# Patient Record
Sex: MALE | Race: WHITE | NOT HISPANIC OR LATINO | Employment: OTHER | URBAN - METROPOLITAN AREA
[De-identification: names, ages, dates, MRNs, and addresses within clinical notes are randomized per-mention and may not be internally consistent; named-entity substitution may affect disease eponyms.]

---

## 2023-06-06 ENCOUNTER — TELEPHONE (OUTPATIENT)
Dept: WOUND CARE | Facility: CLINIC | Age: 83
End: 2023-06-06
Payer: COMMERCIAL

## 2023-06-06 NOTE — PATIENT INSTRUCTIONS
Outpatient Ostomy Clinic Telephone Note     Patient met with Dr. Ortiz in clinic and spouse reports concerns about peristomal skin breakdown. Appointment scheduled for assessment 6/8/23 at 11:30am. Patient bring one full set of supplies to the appointment and  check in at Registration in [facility] and wait for WOC to come out and get them. Ok for patient to bring additional support person for teaching needs.     Ayo Harris RN CWOCN  -Securely message with [facility] (more info) - can reach individually by name or search 'WOC Nurse' (Barby) to reach all current WOCs on duty.  WOC Office Phone: [phone]

## 2023-06-08 ENCOUNTER — HOSPITAL ENCOUNTER (OUTPATIENT)
Dept: WOUND CARE | Facility: CLINIC | Age: 83
Discharge: HOME | End: 2023-06-08
Attending: COLON & RECTAL SURGERY | Admitting: COLON & RECTAL SURGERY
Payer: COMMERCIAL

## 2023-06-08 PROCEDURE — G0463 HOSPITAL OUTPT CLINIC VISIT: HCPCS

## 2023-06-08 NOTE — DISCHARGE INSTRUCTIONS
OUTPATIENT OSTOMY INSTRUCTIONS                                                                        Type of Stoma: Colostomy         Supplier: [employer] [phone]      Equipment:    Product # Brand Description   Pouch 11830 Robbie 1 pc CTF convex   Ring 36572 Coloplast Brava   Powder Entrust stoma powder     Liquid Skin Barrier 7807 3M Cavilon No Sting         Miconazole 2% Antifungal Powder                       Procedure:  Close the bottom of the pouch.  If needed cut the opening of your pouch to the correct size (follow pattern or 1/8 inch larger than stoma) and then remove backings from adhesive surfaces.  Remove the soiled pouch and discard.  Wash skin with water and dry.    Then:  Lightly dust reddened skin with miconazole 2% antifungal powder. Wipe away excess.  Dab with liquid skin barrier (skin prep) to secure in place and allow to dry for 30 seconds. For 2 weeks  Then: Apply Ring/Paste: Around edge of pouch opening.               Then: Apply pouching system to stoma site and hold in place for 2-5 minutes.     ______________________________________________________________________________    Pouch Change: Three times weekly       WOC Nurse Specialist: Paris LOYA           Questions: Barby [phone] ()      Follow-up Appointment: If rash does not improve in 2 weeks . Please call Barby [phone] () to request an appointment.

## 2023-06-08 NOTE — PROGRESS NOTES
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 Ortiz     Purpose of this visit: Peristomal Complications    Pertinent Information: Patient recently had colonoscopy with Dr. Ortiz who suggested he see an ostomy nurse. Patient reports that he sweats under his ostomy. Patient also has a fungal rash noted within his abdominal creases. Patient uses a belt, brava strips, and tape to secure his pouch as well.    Present for Teaching Session: Patient and Spouse   Present: KATHLEEN      Current Equipment:    Product # Brand Description   Pouch 44185 [place] 1 pc soft convex, CTF   Ring/Paste 52473 Coloplast Brava Ring     Powder   Entrust Stoma powder                       Pouch Change Frequency: Every 3-4 days  Provider of Care: Emptying: Self Pouch Change: Self    ASSESSMENT  Last Photo: 6/8/23    Stoma Size: Oval 1inch x 1 1/2 inches  Protrusion: Flush  Stoma Appearance: Granulomas and Moist  Mucocutaneous Juncture: Intact   Peristomal Skin: Rash approximately 7cm circumferentially from stoma and extends into pannus crease. Where patient is taping down but larger than the isabela flange. Is under the isabela flange and tape, under brava strips, and under additional tape that patient adds for securement.     Abdominal Assessment: Hernia (location: peristomal)    TREATMENT  Applied: miconazole 2% antifungal powder to peristomal skin and secured in place with Cavilon No Sting barrier film    INTERVENTIONS  Refitting done and Educated on peristomal skin treatment- patient was cutting his pouch to 1 inch, made opening more oval to fit stoma and discussed this with patient and wife.     INSTRUCTIONS GIVEN  WOC Role and Anatomy    PLAN  New Pouching Procedure: See Outpatient Ostomy Instructions. OK for patient to continue with same pouching supplies at  this time, but cut pouch to fit oval stoma. Patient sent with cut template from today. Patient and wife plan to obtain miconazole 2% OTC or through Dr. Ortiz. Instructed patient to try to avoid the brava strips and additional tape at this time to attempt to allow air to the area as the rash heals.     Paris Hollingsworth CWN   Dept. Vocera- Contact [facility] Nurse (Barby) via  Vocera   Dept. Office Number: [phone]

## 2023-06-16 ENCOUNTER — TELEPHONE (OUTPATIENT)
Dept: WOUND CARE | Facility: CLINIC | Age: 83
End: 2023-06-16
Payer: COMMERCIAL

## 2023-06-16 NOTE — TELEPHONE ENCOUNTER
Outpatient ostomy Telephone Note    Patient's wife and daughter called regarding scheduling a follow up outpatient appointment. Patient was seen 6/8 in our clinic. Called to schedule a follow up, reached voicemail. Informed patient and wife that we would have an availability on Tuesday June 20th, but that we are out of the office over the weekend.    Paris CHRISTOPHERLEVI   Dept. Vocera- Contact [facility] Nurse (Barby) via  Vocera   Dept. Office Number: [phone]

## 2023-06-29 ENCOUNTER — HOSPITAL ENCOUNTER (OUTPATIENT)
Dept: WOUND CARE | Facility: CLINIC | Age: 83
Discharge: HOME | End: 2023-06-29
Admitting: COLON & RECTAL SURGERY
Payer: COMMERCIAL

## 2023-06-29 PROCEDURE — G0463 HOSPITAL OUTPT CLINIC VISIT: HCPCS

## 2023-06-29 NOTE — CONSULTS
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 Ortiz     Purpose of this visit: Peristomal Complications    Pertinent Information: Patient recently had colonoscopy with Dr. Ortiz who suggested he see an ostomy nurse. Patient reports that he sweats under his ostomy. He has significant amount of gas output from drinking soda and typically pouch is falling off by day 2.  Patient typically uses 1pc convex isabela pouch with a belt, brava strips, and tape to secure his pouch as well. Skin is raw and weepy throughout, severely itching and has combination of bacterial/fungal rash that inhibits the barrier from adhereing appropriately. Pt also has small peristomal wound at 8 O'clock with granulomas at edges and peristomal hernia.     Present for Teaching Session: Patient, Spouse and daughter present for visit and very concerned that this is not normal. Patient seems to think he has been doing this for many years and assumes this is normal.   Present: NA      Current Equipment:    Product # Brand Description   Pouch 32615 [place] 1 pc soft convex, CTF   Ring/Paste 94711 Coloplast Brava Ring     Powder   antifungal powder                       Pouch Change Frequency: Every 1-2 days  Provider of Care: Emptying: Self Pouch Change: Self    ASSESSMENT  Last Photo: 6/29/23    Skin after being cleansed and scrubbed with vashe. Fungal rash extends across panus and down into the groin    Stoma Size: Oval 1inch x 1 1/2 inches  Protrusion: Flush  Stoma Appearance: Granulomas and Moist  Mucocutaneous Juncture: Intact   Peristomal Skin: Rash approximately 7cm circumferentially from stoma and extends into pannus crease. Where patient is taping down but larger than the isabela flange. Is under the isabela flange and tape, under brava strips, and under  additional tape that patient adds for securement.     Abdominal Assessment: Hernia (location: peristomal)    TREATMENT  Cleansed for 10 minutes with vashe  Applied: miconazole 2% antifungal powder to peristomal skin and secured in place with Cavilon No Sting barrier film    INTERVENTIONS  Refitting done, Educated on peristomal skin treatment, Educated on pouch change procedure and Physcian called: requested oral Diflucan, Betamethasone 1% Lotion and, Cipro 3% drops to treat peristomal skin- patient was cutting his pouch to 1 inch, made opening more oval to fit stoma and discussed this with patient and wife.     INSTRUCTIONS GIVEN  skin care treatment, reviewed diet and gas control    PLAN  New Pouching Procedure: See Outpatient Ostomy Instructions. Discontinue use of tape and Brava strips and decrease carbonation intake.  OK for patient to continue with same pouching supplies at this time, but cut pouch to fit oval stoma. Patient sent with cut template from today. Patient and wife  Will  prescriptions ordered by Dr. Ortiz from [facility] pharmacy in [place] and start using every other day for pouch changes then return to clinic for assessment in 1 week.  heals.     OUTPATIENT OSTOMY INSTRUCTIONS                                                                        Type of Stoma: Colostomy         Supplier: [employer] [phone]      Equipment: Continue 1pc [place] Convex lock and roll        Procedure:  Close the bottom of the pouch.  If needed cut the opening of your pouch to the correct size (follow pattern or 1/8 inch larger than stoma) and then remove backings from adhesive surfaces.  Remove the soiled pouch and discard.     Wash skin with Vashe (blue bottle wound cleanser) for 5-10mins and dry thoroughly.  Then: Thin layer of Betamethasone lotion with Cipro Drops mixed into the betamethasone to irritated area surrounding stoma and allow to completely dry.   Then: Apply a thin dusted layer of antifungal  powder at lower abdomen crease. Brush away the excess powder and blot with Cavilon No sting Skin Prep (blot anywhere the powder touches)  Then: Apply Ring: Around edge of pouch opening.               Then: Apply pouching system to stoma site and hold in place for 2-5 minutes.     ______________________________________________________________________________    Pouch Change: Three times weekly       WOC Nurse Specialist: Ayo Harris RN CWOCN     Questions: [place] [phone] ()      Follow-up Appointment: Ayo Harris Please call Barby [phone] () to request an appointment.      Ayo Harris RN CWOCN  -Securely message with [employer] (more info) - can reach individually by name or search 'WOC Nurse' (Barby) to reach all current WOCs on duty.  WOC Office Phone: [phone]

## 2023-07-03 ENCOUNTER — TELEPHONE (OUTPATIENT)
Dept: WOUND CARE | Facility: CLINIC | Age: 83
End: 2023-07-03
Payer: COMMERCIAL

## 2023-07-03 NOTE — TELEPHONE ENCOUNTER
"[facility] Outpatient Ostomy Clinic    Received call with voicemail from patient daughter 1:35p 7/3 stating bag not sticking with current directions.     Called back spoke to daughter via phone, provided suggestions of continuing the vashe and nystatin, applying the vashe and nystatin to the abd fold, changing the pouch every other day. Confirmed yes ok to blow dry on cool setting. Confirmed will see patient Friday for next appointment.     Miranda LOYA   1st: [employer], call \"Miranda Pham\" or call Group \"[facility] Nurse\"   (2nd option: leave a voicemail at Dept. Office Number: [phone]. Messages checked occasionally M-F)    "

## 2023-07-05 ENCOUNTER — TELEPHONE (OUTPATIENT)
Dept: WOUND CARE | Facility: CLINIC | Age: 83
End: 2023-07-05
Payer: COMMERCIAL

## 2023-07-05 NOTE — TELEPHONE ENCOUNTER
"[facility] Outpatient Ostomy Clinic      Received call from patient daughter inquiring about scheduled appointment for Friday 7/7 if there is an earlier appointment available. Spoke to patient daughter via phone and stated current appointment is still scheduled for Friday 10am and will call back 7/6 if an earlier appointment is available.       Miranda LOYA   1st: [employer], call \"Miranda Pham\" or call Group \"[facility] Nurse\"   (2nd option: leave a voicemail at Dept. Office Number: [phone]. Messages checked occasionally M-F)    "

## 2023-07-07 ENCOUNTER — HOSPITAL ENCOUNTER (OUTPATIENT)
Dept: WOUND CARE | Facility: CLINIC | Age: 83
Discharge: HOME | End: 2023-07-07
Admitting: COLON & RECTAL SURGERY
Payer: COMMERCIAL

## 2023-07-07 PROCEDURE — 17250 CHEM CAUT OF GRANLTJ TISSUE: CPT

## 2023-07-07 PROCEDURE — G0463 HOSPITAL OUTPT CLINIC VISIT: HCPCS

## 2023-07-07 NOTE — PROGRESS NOTES
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 Ortiz     Purpose of this visit: Peristomal Complications    Pertinent Information: Patient returns to clinic for follow up on peristomal skin. They obtained the prescriptions, however they have only had a couple treatments due being unable to get the pouch to stick. Spouse describes it like vaseline which makes me think they obtained the ointment.They didn't bring the prescriptions to clinic today so they will call me to let me know if they truly obtained the lotion and the drops or if prescription was written for ointment. Peristomal skin showing improvement, but still has ongoing care needs.     Present for Teaching Session: Patient, Spouse and daughter present for visit and very concerned that this is not normal. Patient seems to think he has been doing this for many years and assumes this is normal.   Present: NA      Current Equipment: Robbie 1pc CTF convex pouch with Adapt barrier ring.     Pouch Change Frequency: Every 1-2 days  Provider of Care: Emptying: Self Pouch Change: Self    ASSESSMENT  Last Photo:7/7/2023    Skin after being cleansed and scrubbed with vashe. Fungal rash extends across panus and down into the groin but showing improvement     6/29/23        Stoma Size: Oval 1inch x 1 1/2 inches  Protrusion: Flush  Stoma Appearance: Granulomas and Moist  Mucocutaneous Juncture: Intact   Peristomal Skin: Rash approximately 7cm circumferentially from stoma and extends into pannus crease. Diffuse erythema that is lightening and peristomal skin breakdown and denudement improving. Pt has Granulomas from 6-10 treated with silver nitrate.     Abdominal Assessment: Hernia (location: peristomal)    TREATMENT  Cleansed for 10 minutes with vashe  Applied: miconazole 2% antifungal powder to  peristomal skin and secured in place with Cavilon No Sting barrier film    INTERVENTIONS  Refitting done, Educated on peristomal skin treatment, Educated on pouch change procedure and educated on continuation of vashe, betamethasone lotion, cipro drops. Silver nitrate treatment to granulomas from 6-10 O'clock educated on skin discoloration and can take tylenol for discomfort, pt tolerated well    INSTRUCTIONS GIVEN  skin care treatment, reviewed diet and gas control again. Educated on ordering supplies to prevent running out.     PLAN  Continue same Pouching System. Patient sent with cut template from today. Patient and wife        OUTPATIENT OSTOMY INSTRUCTIONS                                                                        Type of Stoma: Colostomy         Supplier: [employer] [phone]      Equipment:    Product # Brand Description   Pouch 55431 [place] 1 pc CTF convex   Barrier ring 8805 Robbie Adapt   Powder 7906 Robbie Adapt   Liquid Skin Barrier 3344 3M Cavilon No Sting                Procedure:  Close the bottom of the pouch.  If needed cut the opening of your pouch to the correct size (follow pattern or 1/8 inch larger than stoma) and then remove backings from adhesive surfaces.  Remove the soiled pouch and discard.     Wash skin with Vashe (blue bottle wound cleanser) for 5-10mins and dry thoroughly.  Then: Thin layer of Betamethasone lotion with Cipro Drops mixed into the betamethasone to irritated area surrounding stoma and allow to completely dry.   Then: Apply a thin dusted layer of antifungal powder at lower abdomen crease. Brush away the excess powder and blot with Cavilon No sting Skin Prep (blot anywhere the powder touches)  Then: Apply Ring: Around edge of pouch opening.               Then: Apply pouching system to stoma site and hold in place for 2-5 minutes.       Ok to purchase Interdry for belt line. This is a silver wicking cloth that helps wick moisture.    ______________________________________________________________________________    Pouch Change: Three times weekly       WOC Nurse Specialist: Ayo CHRISTOPHEROCN     Questions: Barby [phone] ()      Follow-up Appointment: 2 weeks 7/20/23 9:00aam        CARIDAD LalaN  -Securely message with [employer] (more info) - can reach individually by name or search 'WOC Nurse' (Barby) to reach all current WOCs on duty.  WOC Office Phone: [phone]    .

## 2023-07-21 ENCOUNTER — HOSPITAL ENCOUNTER (OUTPATIENT)
Dept: WOUND CARE | Facility: CLINIC | Age: 83
Discharge: HOME | End: 2023-07-21
Attending: INTERNAL MEDICINE | Admitting: INTERNAL MEDICINE
Payer: COMMERCIAL

## 2023-07-21 PROCEDURE — G0463 HOSPITAL OUTPT CLINIC VISIT: HCPCS

## 2023-07-21 NOTE — PROGRESS NOTES
"  OUTPATIENT OSTOMY ASSESSMENT    INTAKE  Type of Stoma: Permanent Colostomy    Diagnosis Pertinent to Stoma: Cancer - Colon or Rectum   Type of Surgery: APR   Surgery Date: 30+years  Following Colorectal: Dr. Ortiz     Purpose of this visit: Peristomal Complications    Pertinent Information: Patient returns to clinic for follow up on peristomal skin. Peristomal skin continues to show improvement.     Present for Teaching Session: Patient, Spouse and daughter    Present: KATHLEEN      Current Equipment: [place] 1pc CTF convex pouch with Adapt barrier ring and ostomy belt     Pouch Change Frequency: Every 2 days  Provider of Care: Emptying: Self Pouch Change: Spouse/dtr assist    ASSESSMENT  Last Photo:7/7/2023 photo didn't save 7/21    Skin after being cleansed and scrubbed with vashe. Fungal rash extends across panus and down into the groin but showing improvement     6/29/23        Stoma Size: Oval 1inch x 1 1/2 inches  Protrusion: Flush  Stoma Appearance: Granulomas and Moist  Mucocutaneous Juncture: Intact   Peristomal Skin:  Diffuse erythema that is circumfrential and extends approx 8cm from stoma. Erythema is lightening over the past month but still persists and peristomal skin breakdown with no further denudement. Pt has large granulomas at 7 O'clock treated with silver nitrate. Pt continues with diffuse rash to Lower pannus crease and groin    Abdominal Assessment: Hernia (location: peristomal)    TREATMENT  Applied: miconazole 2% antifungal powder to peristomal skin and secured in place with Cavilon No Sting barrier film, Silver nitrate to granuloms-pt tolerated very well    INTERVENTIONS  Refitting done, Educated on peristomal skin treatment, Educated on pouch change procedure and educated on continuation of vashe, betamethasone lotion, cipro drops. Silver nitrate treatment to granulomas at 7\"  O'clock, educated on skin discoloration and can take tylenol for discomfort, pt tolerated well.    Call " Placed to [facility] to request additional dosing of Fluconazole due to persistent fungal infection (no call back by end of day, may take until early next week, call placed to pt/spouse) not healing with antifungal powder. Educated pt that he must completely dry his body after showers and not get dressed until skin is completely clean    INSTRUCTIONS GIVEN  Skin care and ways to treat and prevent fungal rash.      PLAN  Continue same Pouching System. Daughter and spouse concerned about skin breakdown returning. Patient will follow up in 1 month to assess      OUTPATIENT OSTOMY INSTRUCTIONS                                                                        Type of Stoma: Colostomy         Supplier: [employer] [phone]      Equipment:    Product # Brand Description   Pouch [address]1755 [place] 1 pc CTF convex   Barrier ring 8805 [place] Adapt   Powder 7906 [place] Adapt   Liquid Skin Barrier 3344 3M Cavilon No Sting                Procedure:  Close the bottom of the pouch.  If needed cut the opening of your pouch to the correct size (follow pattern or 1/8 inch larger than stoma) and then remove backings from adhesive surfaces.  Remove the soiled pouch and discard.     Wash skin with Vashe (blue bottle wound cleanser) for 5-10mins and dry thoroughly.  Then: Apply powder to any red, irritated or open areas. Rub the powder in and brush off excess then blot with Cavilon No Sting Skin prep (blot anywhere the powder touches)..   Then: Apply Ring: Around edge of pouch opening.               Then: Apply pouching system to stoma site and hold in place for 2-5 minutes.  Dust Antifungal powder to lower abdomen crease if rashy.       Ok to purchase Interdry for belt line. This is a silver wicking cloth that helps wick moisture.   ______________________________________________________________________________    Pouch Change: Three times weekly       WOC Nurse Specialist: Ayo Harris RN CWOCN     Questions:  Barby [phone] ()      Follow-up Appointment: 8/22/23 at 10:00, patient may call and cancel appointment if things are going well        Ayo Harris RN CWOCN  -Securely message with [employer] (more info) - can reach individually by name or search 'WOC Nurse' (Barby) to reach all current WOCs on duty.  WOC Office Phone: [phone]    .

## 2023-08-11 ENCOUNTER — HOSPITAL ENCOUNTER (OUTPATIENT)
Dept: WOUND CARE | Facility: CLINIC | Age: 83
Discharge: HOME | End: 2023-08-11
Admitting: COLON & RECTAL SURGERY
Payer: COMMERCIAL

## 2023-08-11 PROCEDURE — G0463 HOSPITAL OUTPT CLINIC VISIT: HCPCS

## 2023-08-11 NOTE — PROGRESS NOTES
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 Ortiz     Purpose of this visit: Peristomal Complications    Pertinent Information: Patient returns to clinic for follow up on peristomal skin with new wound at 9 O'clock. Peristomal skin continues to show improvement, however he has a persistent rash to groin and lower pannus.    Present for Teaching Session: Patient and Spouse   Present: KATHLEEN      Current Equipment: Robbie 1pc CTF convex pouch with Adapt barrier ring and ostomy belt     Pouch Change Frequency: Every 2-3 days  Provider of Care: Emptying: Self Pouch Change: Spouse/dtr assist    ASSESSMENT  Last Photo: 8/11/23 7/7/2023       Skin after being cleansed and scrubbed with vashe. Fungal rash extends across panus and down into the groin but showing improvement     6/29/23        Stoma Size: Approx Oval 1inch x 1 1/2 inches  Protrusion: Flush  Stoma Appearance: Large granuloma at 7 O'clock   Mucocutaneous Juncture: Intact   Peristomal Skin:  Diffuse chronic erythema extends approx 8cm from stoma that is slowly resolving with time and good skin care. Pt has large granuloma at 7 O'clock treated with silver nitrate. New wound with 100% granulation 1.5 x 1.1 x 0.1 at 9 O'clock approximately 1cm from stoma and lines up with belt tab and belt.  Pt continues with diffuse rash to Lower pannus crease and groin that has not shown improvement with washing with vashe, oral Fluconazole and topical antifungal.     Abdominal Assessment: Hernia (location: peristomal)    TREATMENT  Applied No Sting Skin barrier, 1 stick of Silver nitrate to granuloma-pt tolerated very well    INTERVENTIONS  Educated on peristomal skin treatment and Educated on pouch change procedure-Treating wound with small piece of hydrofera blue ring    Silver nitrate to  granuloma    Educated pt that he must completely dry his body after showers and not get dressed until skin is completely clean and dry. He reports he has been focusing on this. Current ostomy belt feels like nylon material so will try [place] adapt to see if that works better. He reports he is using clean dry belts.     Educated to stop oral fluconazole and topical miconazole. Will trial topical antibacterial to see if this impacts. Encouraged patient/spouse to set up appointment with Dermatology if antibacterial doesn't seem to impact.    INSTRUCTIONS GIVEN  Skin care as stated above, follow up appointment scheduled    PLAN  Continue same Pouching System, hydroferablue to wound and Robbie ostomy belt with small piece of gauze to decrease pressure from belt tab.      OUTPATIENT OSTOMY INSTRUCTIONS                                                                        Type of Stoma: Colostomy         Supplier: [employer] [phone]      Equipment:    Product # Brand Description   Pouch 36711 Robbie 1 pc CTF convex- Cut pouch just inside 40mm line or follow template   Barrier ring 8805 Robbie Adapt   Powder 7906 Robbie Adapt   Liquid Skin Barrier 3344 3M Cavilon No Sting   Ostomy belt 7299 Robbie Adapt ostomy belt                Procedure:  Close the bottom of the pouch.  If needed cut the opening of your pouch to the correct size (follow pattern or 1/8 inch larger than stoma) and then remove backings from adhesive surfaces.  Remove the soiled pouch and discard.     Wash skin with Vashe (blue bottle wound cleanser) for 5-10mins and dry thoroughly.  Then: Cut a small piece of the hydrofera blue ring and apply over wound outside the stoma at 9 O'Clock ([place] Side [place]).   Then: Apply Ring: Around stoma.               Then: Apply pouching system to stoma site and hold in place for 2-5 minutes.  Trial OTC antibiotic ointment to lower groin/abdomen and allow to fully dry.      Ok to purchase [employer] for  belt line. This is a silver wicking cloth that helps wick moisture.   ______________________________________________________________________________    Pouch Change: Three times weekly       WOC Nurse Specialist: Ayo Harris RN CWOCN     Questions: [place] [phone] ()      Follow-up Appointment: 8/22/23 at 9:00, patient may call and cancel appointment if things are going well        Ayo Harris RN CWOCN  -Securely message with [employer] (more info) - can reach individually by name or search 'WOC Nurse' (Barby) to reach all current WOCs on duty.  WOC Office Phone: [phone]    .

## 2023-08-22 ENCOUNTER — HOSPITAL ENCOUNTER (OUTPATIENT)
Dept: WOUND CARE | Facility: CLINIC | Age: 83
Discharge: HOME | End: 2023-08-22
Admitting: COLON & RECTAL SURGERY
Payer: COMMERCIAL

## 2023-08-22 PROCEDURE — G0463 HOSPITAL OUTPT CLINIC VISIT: HCPCS

## 2023-08-22 NOTE — PROGRESS NOTES
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 Ortiz     Purpose of this visit: Peristomal Complications    Pertinent Information: Patient returns to clinic for follow up on peristomal skin. Wound has been healing, however patient is having increasing erythema and weeping skin. Suspect patient could be having reaction to the tape adhesive now that we are done treating peristomal skin with topicals and erythema coming back. They used antibiotic ointment to pannus and rash almost completely resolved. Today patient has pouch taped down again despite education on no tape on skin and pouch must be changed. Patient does have strong odor of urine today and is scratching intensely at his abdomen. Discussed doing a trial of Coloplast pouching systems.    Present for Teaching Session: Patient, Spouse, and daughter   Present: NA      Current Equipment: Robbie 1pc CTF convex pouch with Adapt barrier ring and ostomy belt using hydrofera blue for small wound at 9 O'clock.    Pouch Change Frequency: Every 2-3 days  Provider of Care: Emptying: Self Pouch Change: Spouse/dtr assist    ASSESSMENT  Photo 8/22 not saved    Last Photo: 8/11/23 7/7/2023       Skin after being cleansed and scrubbed with vashe. Fungal rash extends across panus and down into the groin but showing improvement     6/29/23        Stoma Size: Approx Oval 1inch x 1 1/2 inches  Protrusion: Flush  Stoma Appearance: Large granuloma at 7 O'clock   Mucocutaneous Juncture: Intact   Peristomal Skin:  Diffuse chronic erythema extends approx 8cm from stoma that was resolving and now is returning with denuded tissue and scattered satellite lesions. Pt has large granuloma at 7 O'clock treated with silver nitrate. New wound with new epithelial tissue  Pt continues with diffuse rash to Lower  pannus crease and groin that has not shown improvement with washing with vashe, oral Fluconazole and topical antifungal.     Abdominal Assessment: Hernia (location: peristomal)    TREATMENT  Applied No Sting Skin barrier and Applied: antifungal powder , 1 stick of Silver nitrate to granuloma-pt tolerated very well    INTERVENTIONS  Educated on peristomal skin treatment and Educated on pouch change procedure    Silver nitrate to granuloma      INSTRUCTIONS GIVEN  Skin care as stated above, follow up appointment scheduled    PLAN  Will trial switching to Coloplast light pouching system with belt to see if tapeless helps his skin. Pt may benefit deeper convexity with belt.      OUTPATIENT OSTOMY INSTRUCTIONS                                                                        Type of Stoma: Colostomy         Supplier: [email or web address] [phone]      Equipment:    Product # Brand Description   Pouch 88034 Coloplast 1 pc CTF light convex   Barrier ring 8805 Robbie Adapt   Powder 7906 [place] Adapt   Liquid Skin Barrier 3344 3M Cavilon No Sting   Skin Barrier [place] 307649 Brava Coloplast Adhesive spray   Ostomy belt 3924 Brava Coloplast ostomy belt                Procedure:  Close the bottom of the pouch.  If needed cut the opening of your pouch to the correct size (follow pattern or 1/8 inch larger than stoma) and then remove backings from adhesive surfaces.  Remove the soiled pouch and discard.     Wash skin with Vashe (blue bottle wound cleanser) for 5mins and dry thoroughly.  Then: Dust all tissue around stoma with antifungal powder (Miconazole) brush off excess. Then apply a small amount of stoma powder directly around the stoma to any open areas and brush off excess. Spray entire area with the Skin barrier spray and allow to dry for 30 seconds.   Then: Apply Ring: Around stoma or onto the cut edge of the barrier.              Then: Apply pouching system to stoma site and hold in place for 2-5 minutes.  Then:  Apply the ostomy Belt.      ______________________________________________________________________________    Pouch Change: Three times weekly    Purchase: Miconazole Powder-Antifungal powder       WOC Nurse Specialist: Ayo Harris RN CWOCN     Questions: [place] [phone] ()      Follow-up Appointment: 8/31/23 at 9:00, patient may call and cancel appointment if things are going well  Ayo Harris RN CWOCN  -Securely message with [employer] (more info) - can reach individually by name or search 'WOC Nurse' (Barby) to reach all current WOCs on duty.  WOC Office Phone: [phone]    .

## 2023-09-06 ENCOUNTER — TELEPHONE (OUTPATIENT)
Dept: WOUND CARE | Facility: CLINIC | Age: 83
End: 2023-09-06
Payer: COMMERCIAL

## 2023-09-06 NOTE — TELEPHONE ENCOUNTER
Outpatient Ostomy Clinic Telephone Note     Spouse called to request earlier appointment, appointment currently scheduled for 9/14. WOC returned call and left VM. WOC has appointment availability Friday 9/8 or next week 9/12. Will wait to hear back from spouse to get appointment scheduled.    Aoy Harris RN CWOCN  -Securely message with [email or web address] (more info) - can reach individually by name or search 'WOC Nurse' (Barby) to reach all current WOCs on duty.  WOC Office Phone: [phone]

## 2023-09-08 ENCOUNTER — HOSPITAL ENCOUNTER (OUTPATIENT)
Dept: WOUND CARE | Facility: CLINIC | Age: 83
Discharge: HOME | End: 2023-09-08
Admitting: COLON & RECTAL SURGERY
Payer: COMMERCIAL

## 2023-09-08 DIAGNOSIS — Z43.3 COLOSTOMY CARE (H): Primary | ICD-10-CM

## 2023-09-08 PROCEDURE — G0463 HOSPITAL OUTPT CLINIC VISIT: HCPCS

## 2023-09-08 NOTE — PROGRESS NOTES
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 Ortiz     Purpose of this visit: Peristomal Complications    Pertinent Information: Patient returns to clinic after receiving samples for coloplast he ordered additional coloplast pouches, however [employer] sent a box of [employer]vate[employer] pouches with similar order number. Patient and spouse present to clinic today with concerns of increased breakdown with the isabela pouch. Patient reports he tried using hernia belt, however he felt like it made him sweat more and weep. Patient has had several months of chronic weeping on and off despite skin care with vashe, stoma powder, antifungal powder and use of topical steroids. Peristomal skin has gotten significantly better, however weeping still persists. Change to Coloplast the patient felt helped as the pouch stuck on his skin better, however he didn't like that the stool seemed to get stuck in the pouch. After review of supplies and skin care WO recommended patient follow up with dermatology to see if he may benefit any routine topical treatment to calm down his skin. Patient had used isabela pouches for 30+ years with no issues until the past year and now appears chronically inflamed. [facility] will also have patient keep his appointment next week to ensure he receives his supplies and have coworker assess to see if any other suggestions can be made. Patient also has new small wound at 6 O'clock. Rash to pannus and at edge of barrier has finally resolved with use of oral antifungal and topical antibacterial.       Present for Teaching Session: Patient, Spouse, and daughter   Present: KATHLEEN      Current Equipment: [place] 1pc CTF convex pouch with Adapt barrier ring and ostomy belt using hydrofera blue for small wound at 7 O'clock.    Pouch Change Frequency:  Every 2-3 days  Provider of Care: Emptying: Self Pouch Change: Spouse    ASSESSMENT    9/8/23      Last Photo: 8/11/23 7/7/2023       Skin after being cleansed and scrubbed with vashe. Fungal rash extends across panus and down into the groin but showing improvement     6/29/23        Stoma Size: Approx Oval 1inch x 1 1/2 inches  Protrusion: Flush  Stoma Appearance: Large granuloma at 7 O'clock, mild peristomal irregular denudement  Mucocutaneous Juncture: Intact   Peristomal Skin:  Diffuse chronic erythema extends approx 8cm from stoma that is improving but remains persistent. He has large areas that are actively weeping while pouch sits off today. Few satellite lesions at exterior edge from 12-3 O'clock.  Pt has large granuloma at 7 O'clock treated with silver nitrate. Wound at 9 O'clock has healed but now he has a small 0.5 x 0.5 x 0.1cm wound at 7O'clock. Persistent groin rash now resolved.    Abdominal Assessment: Hernia (location: peristomal)    TREATMENT  Applied No Sting Skin barrier and Applied: stoma powder and skin prep with Coloplast 1pc convex CTF pouch and adapt barrier ring , 1 stick of Silver nitrate to granuloma-pt tolerated very well    INTERVENTIONS  Educated on peristomal skin treatment and Educated on pouch change procedure, educated to make dermatology appointment      INSTRUCTIONS GIVEN  Skin care as stated above, follow up appointment scheduled    PLAN  Will continue to  trial  Coloplast light pouching system with barrier ring and belt to see if tapeless helps his skin. Pt may benefit deeper convexity with belt. True problem seems to be constant weeping from peristomal skin that doesn't allow pouching system to stick so he will try to see dermatology.       OUTPATIENT OSTOMY INSTRUCTIONS                                                                        Type of Stoma: Colostomy         Supplier: [employer] [phone] or [place] [phone]      Equipment:    Product #  Brand Description   Pouch 16618 Coloplast 1 pc CTF light convex   Barrier ring 8805 Robbie Adapt   Powder 7906 Robbie Adapt   Liquid Skin Barrier 3344 3M Cavilon No Sting   Skin Barrier [place] 121635 Brava Coloplast Adhesive spray   Ostomy belt 3843 Brava Coloplast ostomy belt                Procedure:  Close the bottom of the pouch.  If needed cut the opening of your pouch to the correct size (follow pattern or 1/8 inch larger than stoma) and then remove backings from adhesive surfaces.  Remove the soiled pouch and discard.     Wash skin with Vashe (blue bottle wound cleanser) for 5mins and dry thoroughly.  Then: Dust all tissue around stoma with stoma powder directly around the stoma to any open areas and brush off excess. Spray entire area with the Skin barrier spray and allow to dry for 30 seconds.   Then: Apply Ring: Around stoma or onto the cut edge of the barrier.              Then: Apply pouching system to stoma site and hold in place for 2-5 minutes.  Then: Apply the ostomy Belt.    ______________________________________________________________________________    Pouch Change: Three times weekly    Make Dermatology appointment       WO Nurse Specialist: Ayo Harris RN CWOCN     Questions: Barby [phone]      Follow-up Appointment: 9/14/23 1100 , patient may call and cancel appointment if things are going well    Ayo Harris RN CWOCN  -Securely message with [employer] (more info) - can reach individually by name or search 'WOC Nurse' (Barby) to reach all current WOCs on duty.  [facility] Office Phone: [phone]    .       Home

## 2023-09-28 ENCOUNTER — TRANSFERRED RECORDS (OUTPATIENT)
Dept: HEALTH INFORMATION MANAGEMENT | Facility: CLINIC | Age: 83
End: 2023-09-28

## 2024-02-27 ENCOUNTER — HOSPITAL ENCOUNTER (OUTPATIENT)
Dept: WOUND CARE | Facility: CLINIC | Age: 84
Discharge: HOME | End: 2024-02-27
Attending: COLON & RECTAL SURGERY | Admitting: COLON & RECTAL SURGERY
Payer: COMMERCIAL

## 2024-02-27 PROCEDURE — G0463 HOSPITAL OUTPT CLINIC VISIT: HCPCS

## 2024-02-27 NOTE — PROGRESS NOTES
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 Ortiz     Purpose of this visit: Peristomal Complications: skin breakdown and granuloma treatment. They are traveling out of town and noticed increased breakdown and wanting to make sure they are doing things correctly. Spouse cutting pouch slightly too small.     Pertinent Information:       Present for Teaching Session: Patient and Spouse   Present: KATHLEEN      Current Equipment: [place] 1pc CTF convex pouch 21881 with Adapt barrier ring and ostomy belt    Pouch Change Frequency: Every 2-3 days  Provider of Care: Emptying: Self Pouch Change: Spouse    ASSESSMENT    2/27/24  Colostomy      Ostomy Side view        Stoma Size: Approx Oval 1inch x 1 1/2 inches  Protrusion: Flush  Stoma Appearance: Large granulomas from 5-7 O'Clock, mild peristomal irregular denudement at 10 O'clock  Mucocutaneous Juncture: Intact   Peristomal Skin:  Diffuse chronic erythema and skin damage extends approx 8cm has shown great improvement  but remains persistent. Small area of weeping noted at Left lateral edge of pouching system due to friable epidermis    Abdominal Assessment: Hernia (location: peristomal)    TREATMENT  Applied No Sting Skin barrier and Applied: stoma powder and skin prep with Coloplast 1pc convex CTF pouch and adapt barrier ring , 3 sticks of Silver nitrate to granuloma-pt tolerated very well    INTERVENTIONS  Educated on peristomal skin treatment and Educated on pouch change procedure      INSTRUCTIONS GIVEN  Skin care as stated above, follow up appointment scheduled    PLAN  No changes to skin care, continue routine with stoma powder and skin prep PRN.  Pt may benefit deeper convexity but likes this pouch and doesn't want to change right now. Spouse has been using mineral oil to help stool fall into  pouch, recommended lubricating deodorizer and provided order numbers. Educated on cutting to 35mm and slightly past on sides due to stoma being oval.       OUTPATIENT OSTOMY INSTRUCTIONS                                                                        Type of Stoma: Colostomy         Supplier: [facility] [employer] [phone] or [facility] [employer] [phone]      Equipment:    Product # Brand Description   Pouch 12922 Coloplast 1 pc CTF light convex   Barrier ring 8805 [place] Adapt   Powder 7906 [place] Adapt   Liquid Skin Barrier 3344 3M Cavilon No Sting   Skin Barrier [place] 362515 Brava Coloplast Adhesive spray   Ostomy belt 4247 Brava Coloplast ostomy belt   Deodorizer/lubricant 35405 Brava Ostomy lubricating deodorizer                Procedure:  Close the bottom of the pouch.  If needed cut the opening of your pouch to the correct size (follow pattern or 1/8 inch larger than stoma) and then remove backings from adhesive surfaces.  Remove the soiled pouch and discard.     Clean skin with water and dry gauze or towel  Then: Dust all tissue around stoma with stoma powder directly around the stoma to any open areas and brush off excess. Spray entire area with the Skin barrier spray and allow to dry for 30 seconds.   Then: Apply Ring: Around stoma or onto the cut edge of the barrier.              Then: Apply pouching system to stoma site and hold in place for 2-5 minutes.  Then: Apply the ostomy Belt.    ______________________________________________________________________________    Pouch Change: Three times weekly       WOC Nurse Specialist: Ayo Harris RN CWOCN     Questions: [place] [phone] ()      Follow-up Appointment: As needed     CARIDAD LalaOCN  -Securely message with [employer] (more info) - can reach individually by name or search 'WOC Nurse' (Barby) to reach all current WOCs on duty.  WOC Office Phone: [phone]    .

## 2024-02-28 NOTE — DISCHARGE INSTRUCTIONS
OUTPATIENT OSTOMY INSTRUCTIONS                                                                        Type of Stoma: Colostomy         Supplier: [facility] [employer] [phone] or [facility] [phone]      Equipment:    Product # Brand Description   Pouch 36762 Coloplast 1 pc CTF light convex   Barrier ring 8805 [place] Adapt   Powder 7906 Robbie Adapt   Liquid Skin Barrier 3344 3M Cavilon No Sting   Skin Barrier [place] 998441 Brava Coloplast Adhesive spray   Ostomy belt 4246 Brava Coloplast ostomy belt   Deodorizer/lubricant 40020 Brava Ostomy lubricating deodorizer                Procedure:  Close the bottom of the pouch.  If needed cut the opening of your pouch to the correct size (follow pattern or 1/8 inch larger than stoma) and then remove backings from adhesive surfaces.  Remove the soiled pouch and discard.     Clean skin with water and dry gauze or towel  Then: Dust all tissue around stoma with stoma powder directly around the stoma to any open areas and brush off excess. Spray entire area with the Skin barrier spray and allow to dry for 30 seconds.   Then: Apply Ring: Around stoma or onto the cut edge of the barrier.              Then: Apply pouching system to stoma site and hold in place for 2-5 minutes.  Then: Apply the ostomy Belt.    ______________________________________________________________________________    Pouch Change: 2-3 times weekly       WOC Nurse Specialist: Ayo Harris RN CWOCN     Questions: Barby [phone] ()      Follow-up Appointment: As needed     CARIDAD LalaOCN  -Securely message with [employer] (more info) - can reach individually by name or search 'WOC Nurse' (Barby) to reach all current WOCs on duty.  WOC Office Phone: [phone]